# Patient Record
Sex: MALE | Race: BLACK OR AFRICAN AMERICAN | NOT HISPANIC OR LATINO | Employment: FULL TIME | URBAN - METROPOLITAN AREA
[De-identification: names, ages, dates, MRNs, and addresses within clinical notes are randomized per-mention and may not be internally consistent; named-entity substitution may affect disease eponyms.]

---

## 2023-07-20 ENCOUNTER — OFFICE VISIT (OUTPATIENT)
Dept: FAMILY MEDICINE | Facility: CLINIC | Age: 28
End: 2023-07-20
Payer: COMMERCIAL

## 2023-07-20 VITALS
DIASTOLIC BLOOD PRESSURE: 88 MMHG | OXYGEN SATURATION: 97 % | TEMPERATURE: 98.1 F | HEART RATE: 74 BPM | WEIGHT: 215 LBS | SYSTOLIC BLOOD PRESSURE: 135 MMHG | RESPIRATION RATE: 14 BRPM

## 2023-07-20 DIAGNOSIS — J02.9 SORE THROAT: ICD-10-CM

## 2023-07-20 DIAGNOSIS — J02.0 STREP THROAT: Primary | ICD-10-CM

## 2023-07-20 LAB — DEPRECATED S PYO AG THROAT QL EIA: POSITIVE

## 2023-07-20 PROCEDURE — 87880 STREP A ASSAY W/OPTIC: CPT | Performed by: PHYSICIAN ASSISTANT

## 2023-07-20 PROCEDURE — 99203 OFFICE O/P NEW LOW 30 MIN: CPT | Performed by: PHYSICIAN ASSISTANT

## 2023-07-20 RX ORDER — PENICILLIN V POTASSIUM 500 MG/1
500 TABLET, FILM COATED ORAL 2 TIMES DAILY
Qty: 20 TABLET | Refills: 0 | Status: SHIPPED | OUTPATIENT
Start: 2023-07-20 | End: 2023-07-30

## 2023-07-20 NOTE — PROGRESS NOTES
Patient presents with:  Throat Problem: Sore throat for 2 days       Clinical Decision Making:  Strep test was obtained and was positive. Treatment with Pen VK.  Symptomatic care was gone over. Expected course of resolution and indication for return was gone over and questions were answered to patient/parent's satisfaction before discharge.        ICD-10-CM    1. Strep throat  J02.0 penicillin V (VEETID) 500 MG tablet      2. Sore throat  J02.9 Streptococcus A Rapid Screen w/Reflex to PCR - Clinic Collect          Patient Instructions   Take the antibiotic as written  Noncontagious after 24 hours on the antibiotic  Use of over-the-counter Cepacol (benzocaine) throat lozenges for comfort.  Follow packaging directions  Increased rest increase fluids    Follow up with primary care provider if you do not get resolution with the course of treatment.  Return to walk-in care if complication or new symptoms arise in the interim.        HPI:  Shanita Stern is a 28 year old male who presents today for evaluation of sore throat and odynophagia for 2 days. Patient denies fever, chills, night sweats, fatigue, vomiting, diarrhea, skin rash, abdominal pain or urinary symptoms.      No known sick contacts for strep throat.    Has not tried treatment for this over-the-counter.    History obtained from chart review and the patient.    Problem List:  There are no relevant problems documented for this patient.      History reviewed. No pertinent past medical history.    Social History     Tobacco Use     Smoking status: Never     Smokeless tobacco: Never   Substance Use Topics     Alcohol use: Not on file       Review of Systems  As above in HPI otherwise negative.    Vitals:    07/20/23 1351   BP: 135/88   Pulse: 74   Resp: 14   Temp: 98.1  F (36.7  C)   TempSrc: Oral   SpO2: 97%   Weight: 97.5 kg (215 lb)       General: Patient is resting comfortably no acute distress is afebrile  HEENT: Head is normocephalic atraumatic   eyes are  PERRL EOMI sclera anicteric   TMs are clear bilaterally  Throat is without pharyngeal wall erythema and no exudate  No cervical lymphadenopathy present  LUNGS: Clear to auscultation bilaterally  HEART: Regular rate and rhythm  Skin: Without rash non-diaphoretic    Physical Exam      Labs:  Results for orders placed or performed in visit on 07/20/23   Streptococcus A Rapid Screen w/Reflex to PCR - Clinic Collect     Status: Abnormal    Specimen: Throat; Swab   Result Value Ref Range    Group A Strep antigen Positive (A) Negative     At the end of the encounter, I discussed results, diagnosis, medications. Discussed red flags for immediate return to clinic/ER, as well as indications for follow up if no improvement. Patient understood and agreed to plan. Patient was stable for discharge.

## 2023-07-20 NOTE — LETTER
July 20, 2023      Shanita Stern  [address] [id number] [facility] [id number]        To Whom It May Concern:    Shanita Stern was seen in our clinic today. He may return to work without restrictions 7/21/23.      Sincerely,        Bola Armendariz PA-C

## 2023-07-20 NOTE — PATIENT INSTRUCTIONS
Take the antibiotic as written  Noncontagious after 24 hours on the antibiotic  Use of over-the-counter Cepacol (benzocaine) throat lozenges for comfort.  Follow packaging directions  Increased rest increase fluids    Follow up with primary care provider if you do not get resolution with the course of treatment.  Return to walk-in care if complication or new symptoms arise in the interim.